# Patient Record
Sex: FEMALE | Race: OTHER | HISPANIC OR LATINO
[De-identification: names, ages, dates, MRNs, and addresses within clinical notes are randomized per-mention and may not be internally consistent; named-entity substitution may affect disease eponyms.]

---

## 2017-09-06 ENCOUNTER — APPOINTMENT (OUTPATIENT)
Dept: INTERNAL MEDICINE | Facility: CLINIC | Age: 47
End: 2017-09-06
Payer: COMMERCIAL

## 2017-09-06 PROCEDURE — 99212 OFFICE O/P EST SF 10 MIN: CPT

## 2017-10-04 ENCOUNTER — APPOINTMENT (OUTPATIENT)
Dept: MAMMOGRAPHY | Facility: CLINIC | Age: 47
End: 2017-10-04
Payer: COMMERCIAL

## 2017-10-04 ENCOUNTER — APPOINTMENT (OUTPATIENT)
Dept: ULTRASOUND IMAGING | Facility: CLINIC | Age: 47
End: 2017-10-04
Payer: COMMERCIAL

## 2017-10-04 ENCOUNTER — OUTPATIENT (OUTPATIENT)
Dept: OUTPATIENT SERVICES | Facility: HOSPITAL | Age: 47
LOS: 1 days | End: 2017-10-04
Payer: COMMERCIAL

## 2017-10-04 DIAGNOSIS — Z00.8 ENCOUNTER FOR OTHER GENERAL EXAMINATION: ICD-10-CM

## 2017-10-04 PROCEDURE — G0279: CPT

## 2017-10-04 PROCEDURE — G0204: CPT | Mod: 26

## 2017-10-04 PROCEDURE — G0279: CPT | Mod: 26

## 2017-10-04 PROCEDURE — 76641 ULTRASOUND BREAST COMPLETE: CPT | Mod: 26,50

## 2017-10-04 PROCEDURE — 77066 DX MAMMO INCL CAD BI: CPT

## 2017-10-04 PROCEDURE — 76641 ULTRASOUND BREAST COMPLETE: CPT

## 2017-11-10 ENCOUNTER — APPOINTMENT (OUTPATIENT)
Dept: INTERNAL MEDICINE | Facility: CLINIC | Age: 47
End: 2017-11-10
Payer: COMMERCIAL

## 2017-11-10 PROCEDURE — 99396 PREV VISIT EST AGE 40-64: CPT

## 2017-11-11 VITALS
WEIGHT: 150 LBS | BODY MASS INDEX: 24.21 KG/M2 | RESPIRATION RATE: 14 BRPM | HEART RATE: 88 BPM | SYSTOLIC BLOOD PRESSURE: 100 MMHG | DIASTOLIC BLOOD PRESSURE: 60 MMHG

## 2017-11-12 LAB
25(OH)D3 SERPL-MCNC: 39.2 NG/ML
ADJUSTED MITOGEN: >10 IU/ML
ADJUSTED TB AG: 0 IU/ML
ALBUMIN SERPL ELPH-MCNC: 4.3 G/DL
ALP BLD-CCNC: 53 U/L
ALT SERPL-CCNC: 17 U/L
ANA SER IF-ACNC: NEGATIVE
ANION GAP SERPL CALC-SCNC: 14 MMOL/L
AST SERPL-CCNC: 51 U/L
BASOPHILS # BLD AUTO: 0.02 K/UL
BASOPHILS NFR BLD AUTO: 0.2 %
BILIRUB SERPL-MCNC: 0.4 MG/DL
BUN SERPL-MCNC: 10 MG/DL
CALCIUM SERPL-MCNC: 10.1 MG/DL
CCP AB SER IA-ACNC: <8 UNITS
CHLORIDE SERPL-SCNC: 102 MMOL/L
CHOLEST SERPL-MCNC: 195 MG/DL
CHOLEST/HDLC SERPL: 2.7 RATIO
CO2 SERPL-SCNC: 25 MMOL/L
CREAT SERPL-MCNC: 0.79 MG/DL
CRP SERPL-MCNC: <0.2 MG/DL
EOSINOPHIL # BLD AUTO: 0.07 K/UL
EOSINOPHIL NFR BLD AUTO: 0.6 %
ERYTHROCYTE [SEDIMENTATION RATE] IN BLOOD BY WESTERGREN METHOD: 7 MM/HR
GLUCOSE SERPL-MCNC: 96 MG/DL
HBA1C MFR BLD HPLC: 5.2 %
HCT VFR BLD CALC: 40.2 %
HDLC SERPL-MCNC: 73 MG/DL
HGB BLD-MCNC: 13.2 G/DL
IMM GRANULOCYTES NFR BLD AUTO: 0.3 %
LDLC SERPL CALC-MCNC: 112 MG/DL
LYMPHOCYTES # BLD AUTO: 2.1 K/UL
LYMPHOCYTES NFR BLD AUTO: 18 %
M TB IFN-G BLD-IMP: NEGATIVE
MAN DIFF?: NORMAL
MCHC RBC-ENTMCNC: 29.3 PG
MCHC RBC-ENTMCNC: 32.8 GM/DL
MCV RBC AUTO: 89.3 FL
MONOCYTES # BLD AUTO: 0.7 K/UL
MONOCYTES NFR BLD AUTO: 6 %
NEUTROPHILS # BLD AUTO: 8.73 K/UL
NEUTROPHILS NFR BLD AUTO: 74.9 %
PLATELET # BLD AUTO: 244 K/UL
POTASSIUM SERPL-SCNC: 3.5 MMOL/L
PROT SERPL-MCNC: 7.6 G/DL
QUANTIFERON GOLD NIL: 0.02 IU/ML
RBC # BLD: 4.5 M/UL
RBC # FLD: 13.2 %
RF+CCP IGG SER-IMP: NEGATIVE
SODIUM SERPL-SCNC: 141 MMOL/L
TRIGL SERPL-MCNC: 48 MG/DL
TSH SERPL-ACNC: 1.07 UIU/ML
WBC # FLD AUTO: 11.65 K/UL

## 2018-10-22 ENCOUNTER — RESULT REVIEW (OUTPATIENT)
Age: 48
End: 2018-10-22

## 2018-10-30 ENCOUNTER — TRANSCRIPTION ENCOUNTER (OUTPATIENT)
Age: 48
End: 2018-10-30

## 2018-10-30 ENCOUNTER — OUTPATIENT (OUTPATIENT)
Dept: OUTPATIENT SERVICES | Facility: HOSPITAL | Age: 48
LOS: 1 days | End: 2018-10-30
Payer: COMMERCIAL

## 2018-10-30 VITALS
TEMPERATURE: 99 F | RESPIRATION RATE: 16 BRPM | OXYGEN SATURATION: 99 % | HEIGHT: 65 IN | DIASTOLIC BLOOD PRESSURE: 78 MMHG | HEART RATE: 88 BPM | WEIGHT: 149.91 LBS | SYSTOLIC BLOOD PRESSURE: 118 MMHG

## 2018-10-30 DIAGNOSIS — Z98.890 OTHER SPECIFIED POSTPROCEDURAL STATES: Chronic | ICD-10-CM

## 2018-10-30 DIAGNOSIS — N92.5 OTHER SPECIFIED IRREGULAR MENSTRUATION: ICD-10-CM

## 2018-10-30 PROCEDURE — G0463: CPT

## 2018-10-30 RX ORDER — LIDOCAINE HCL 20 MG/ML
0.2 VIAL (ML) INJECTION ONCE
Qty: 0 | Refills: 0 | Status: DISCONTINUED | OUTPATIENT
Start: 2018-10-31 | End: 2018-11-15

## 2018-10-30 RX ORDER — SODIUM CHLORIDE 9 MG/ML
3 INJECTION INTRAMUSCULAR; INTRAVENOUS; SUBCUTANEOUS EVERY 8 HOURS
Qty: 0 | Refills: 0 | Status: DISCONTINUED | OUTPATIENT
Start: 2018-10-31 | End: 2018-11-15

## 2018-10-30 NOTE — H&P PST ADULT - PROBLEM SELECTOR PLAN 1
Dilation and curettage    PSt instruction given    Has CBC done Oct 23, 2018 on alscript Dilation and curettage    PSt instruction given    Has CBC done Oct 23, 2018 on alscript   Patient is" Jehovah Witness " paperwork for refusal of blood transfusion will bring in AM

## 2018-10-30 NOTE — H&P PST ADULT - HISTORY OF PRESENT ILLNESS
46 y/o female came in for PSt today for dilation and curettage. Patient has hx of irregular menstrual bleeding , She has been bleeding since September 13,2018. She was being follow-up by Gyn  MD  started on medication, but continuously bleeds . Re-evaluated and now schedule this procedure for treatment. 48 y/o female came in for PSt today for dilation and curettage. Patient has hx of irregular menstrual bleeding , She has been bleeding since September 13,2018. She was being follow-up by Gyn  MD  started on medication, but continuously bleeds . Re-evaluated and now schedule this procedure for treatment.      *** Patient is Jehovah Witness  will bring her paper for blood transfusion refusal  in AM ******

## 2018-10-31 ENCOUNTER — OUTPATIENT (OUTPATIENT)
Dept: OUTPATIENT SERVICES | Facility: HOSPITAL | Age: 48
LOS: 1 days | End: 2018-10-31
Payer: COMMERCIAL

## 2018-10-31 ENCOUNTER — RESULT REVIEW (OUTPATIENT)
Age: 48
End: 2018-10-31

## 2018-10-31 VITALS
RESPIRATION RATE: 16 BRPM | OXYGEN SATURATION: 100 % | SYSTOLIC BLOOD PRESSURE: 99 MMHG | DIASTOLIC BLOOD PRESSURE: 56 MMHG | HEART RATE: 77 BPM

## 2018-10-31 VITALS
HEART RATE: 74 BPM | DIASTOLIC BLOOD PRESSURE: 67 MMHG | SYSTOLIC BLOOD PRESSURE: 103 MMHG | HEIGHT: 65 IN | WEIGHT: 149.91 LBS | OXYGEN SATURATION: 100 % | RESPIRATION RATE: 18 BRPM | TEMPERATURE: 99 F

## 2018-10-31 DIAGNOSIS — N92.5 OTHER SPECIFIED IRREGULAR MENSTRUATION: ICD-10-CM

## 2018-10-31 DIAGNOSIS — Z98.890 OTHER SPECIFIED POSTPROCEDURAL STATES: Chronic | ICD-10-CM

## 2018-10-31 PROCEDURE — 88305 TISSUE EXAM BY PATHOLOGIST: CPT | Mod: 26

## 2018-10-31 PROCEDURE — 58120 DILATION AND CURETTAGE: CPT

## 2018-10-31 PROCEDURE — 88305 TISSUE EXAM BY PATHOLOGIST: CPT

## 2018-10-31 RX ORDER — ONDANSETRON 8 MG/1
4 TABLET, FILM COATED ORAL ONCE
Qty: 0 | Refills: 0 | Status: DISCONTINUED | OUTPATIENT
Start: 2018-10-31 | End: 2018-11-15

## 2018-10-31 RX ORDER — OXYCODONE HYDROCHLORIDE 5 MG/1
10 TABLET ORAL ONCE
Qty: 0 | Refills: 0 | Status: DISCONTINUED | OUTPATIENT
Start: 2018-10-31 | End: 2018-10-31

## 2018-10-31 RX ORDER — OXYCODONE HYDROCHLORIDE 5 MG/1
5 TABLET ORAL ONCE
Qty: 0 | Refills: 0 | Status: DISCONTINUED | OUTPATIENT
Start: 2018-10-31 | End: 2018-10-31

## 2018-10-31 RX ORDER — MILK THISTLE 180 MG
0 CAPSULE ORAL
Qty: 0 | Refills: 0 | COMMUNITY

## 2018-10-31 RX ORDER — SODIUM CHLORIDE 9 MG/ML
1000 INJECTION, SOLUTION INTRAVENOUS
Qty: 0 | Refills: 0 | Status: DISCONTINUED | OUTPATIENT
Start: 2018-10-31 | End: 2018-11-15

## 2018-10-31 RX ORDER — BENZOYL PEROXIDE MICRONIZED 5.8 %
1 TOWELETTE (EA) TOPICAL
Qty: 0 | Refills: 0 | COMMUNITY

## 2018-10-31 RX ORDER — MEDROXYPROGESTERONE ACETATE 150 MG/ML
1 INJECTION, SUSPENSION, EXTENDED RELEASE INTRAMUSCULAR
Qty: 0 | Refills: 0 | COMMUNITY

## 2018-10-31 NOTE — BRIEF OPERATIVE NOTE - PROCEDURE
<<-----Click on this checkbox to enter Procedure Dilatation & curettage  10/31/2018    Active  SSEIDMA

## 2018-10-31 NOTE — ASU DISCHARGE PLAN (ADULT/PEDIATRIC). - ACTIVITY LEVEL
nothing per vagina/no tampons/no sports/gym/no heavy lifting/no tub baths/no douching/no intercourse

## 2018-11-01 PROBLEM — J45.909 UNSPECIFIED ASTHMA, UNCOMPLICATED: Chronic | Status: ACTIVE | Noted: 2018-10-30

## 2018-11-01 PROBLEM — G43.909 MIGRAINE, UNSPECIFIED, NOT INTRACTABLE, WITHOUT STATUS MIGRAINOSUS: Chronic | Status: ACTIVE | Noted: 2018-10-30

## 2018-11-01 LAB — SURGICAL PATHOLOGY STUDY: SIGNIFICANT CHANGE UP

## 2018-11-21 ENCOUNTER — APPOINTMENT (OUTPATIENT)
Dept: MAMMOGRAPHY | Facility: CLINIC | Age: 48
End: 2018-11-21
Payer: COMMERCIAL

## 2018-11-21 ENCOUNTER — APPOINTMENT (OUTPATIENT)
Dept: ULTRASOUND IMAGING | Facility: CLINIC | Age: 48
End: 2018-11-21
Payer: COMMERCIAL

## 2018-11-21 ENCOUNTER — OUTPATIENT (OUTPATIENT)
Dept: OUTPATIENT SERVICES | Facility: HOSPITAL | Age: 48
LOS: 1 days | End: 2018-11-21
Payer: COMMERCIAL

## 2018-11-21 DIAGNOSIS — Z98.890 OTHER SPECIFIED POSTPROCEDURAL STATES: Chronic | ICD-10-CM

## 2018-11-21 DIAGNOSIS — Z12.31 ENCOUNTER FOR SCREENING MAMMOGRAM FOR MALIGNANT NEOPLASM OF BREAST: ICD-10-CM

## 2018-11-21 DIAGNOSIS — R92.2 INCONCLUSIVE MAMMOGRAM: ICD-10-CM

## 2018-11-21 PROCEDURE — 77067 SCR MAMMO BI INCL CAD: CPT | Mod: 26

## 2018-11-21 PROCEDURE — 76641 ULTRASOUND BREAST COMPLETE: CPT

## 2018-11-21 PROCEDURE — 77063 BREAST TOMOSYNTHESIS BI: CPT | Mod: 26

## 2018-11-21 PROCEDURE — 76641 ULTRASOUND BREAST COMPLETE: CPT | Mod: 26,50

## 2018-11-21 PROCEDURE — 77063 BREAST TOMOSYNTHESIS BI: CPT

## 2018-11-21 PROCEDURE — 77067 SCR MAMMO BI INCL CAD: CPT

## 2018-12-06 ENCOUNTER — APPOINTMENT (OUTPATIENT)
Dept: MAMMOGRAPHY | Facility: CLINIC | Age: 48
End: 2018-12-06
Payer: COMMERCIAL

## 2018-12-06 ENCOUNTER — OUTPATIENT (OUTPATIENT)
Dept: OUTPATIENT SERVICES | Facility: HOSPITAL | Age: 48
LOS: 1 days | End: 2018-12-06
Payer: COMMERCIAL

## 2018-12-06 ENCOUNTER — APPOINTMENT (OUTPATIENT)
Dept: ULTRASOUND IMAGING | Facility: CLINIC | Age: 48
End: 2018-12-06
Payer: COMMERCIAL

## 2018-12-06 DIAGNOSIS — Z00.8 ENCOUNTER FOR OTHER GENERAL EXAMINATION: ICD-10-CM

## 2018-12-06 DIAGNOSIS — R92.2 INCONCLUSIVE MAMMOGRAM: ICD-10-CM

## 2018-12-06 DIAGNOSIS — Z98.890 OTHER SPECIFIED POSTPROCEDURAL STATES: Chronic | ICD-10-CM

## 2018-12-06 PROCEDURE — 76641 ULTRASOUND BREAST COMPLETE: CPT | Mod: 26,LT

## 2018-12-06 PROCEDURE — 77065 DX MAMMO INCL CAD UNI: CPT | Mod: 26,LT

## 2018-12-06 PROCEDURE — 77065 DX MAMMO INCL CAD UNI: CPT

## 2018-12-06 PROCEDURE — 76641 ULTRASOUND BREAST COMPLETE: CPT

## 2018-12-06 PROCEDURE — G0279: CPT | Mod: 26

## 2018-12-06 PROCEDURE — G0279: CPT

## 2019-11-08 ENCOUNTER — APPOINTMENT (OUTPATIENT)
Dept: INTERNAL MEDICINE | Facility: CLINIC | Age: 49
End: 2019-11-08
Payer: COMMERCIAL

## 2019-11-08 VITALS
BODY MASS INDEX: 23.57 KG/M2 | HEART RATE: 74 BPM | RESPIRATION RATE: 14 BRPM | WEIGHT: 146 LBS | DIASTOLIC BLOOD PRESSURE: 70 MMHG | SYSTOLIC BLOOD PRESSURE: 94 MMHG

## 2019-11-08 DIAGNOSIS — M25.50 PAIN IN UNSPECIFIED JOINT: ICD-10-CM

## 2019-11-08 PROCEDURE — G0442 ANNUAL ALCOHOL SCREEN 15 MIN: CPT

## 2019-11-08 PROCEDURE — G0444 DEPRESSION SCREEN ANNUAL: CPT

## 2019-11-08 PROCEDURE — 99386 PREV VISIT NEW AGE 40-64: CPT

## 2019-11-08 NOTE — HEALTH RISK ASSESSMENT
[Excellent] : ~his/her~  mood as  excellent [Yes] : Yes [Monthly or less (1 pt)] : Monthly or less (1 point) [3 or 4 (1 pt)] : 3 or 4  (1 point) [Never (0 pts)] : Never (0 points) [No] : In the past 12 months have you used drugs other than those required for medical reasons? No [No falls in past year] : Patient reported no falls in the past year [None] : None [With Family] : lives with family [Employed] : employed [] :  [Feels Safe at Home] : Feels safe at home [Fully functional (bathing, dressing, toileting, transferring, walking, feeding)] : Fully functional (bathing, dressing, toileting, transferring, walking, feeding) [Fully functional (using the telephone, shopping, preparing meals, housekeeping, doing laundry, using] : Fully functional and needs no help or supervision to perform IADLs (using the telephone, shopping, preparing meals, housekeeping, doing laundry, using transportation, managing medications and managing finances) [Reports normal functional visual acuity (ie: able to read med bottle)] : Reports normal functional visual acuity [Smoke Detector] : smoke detector [Seat Belt] :  uses seat belt [] : No [1] : 2) Feeling down, depressed, or hopeless for several days (1) [Audit-CScore] : 2 [[id number]] : 2 [Patient reported PAP Smear was normal] : Patient reported PAP Smear was normal [Change in mental status noted] : No change in mental status noted [Language] : denies difficulty with language [Behavior] : denies difficulty with behavior [Learning/Retaining New Information] : denies difficulty learning/retaining new information [Handling Complex Tasks] : denies difficulty handling complex tasks [Reasoning] : denies difficulty with reasoning [Spatial Ability and Orientation] : denies difficulty with spatial ability and orientation [Reports changes in hearing] : Reports no changes in hearing [Reports changes in vision] : Reports no changes in vision [Reports changes in dental health] : Reports no changes in dental health [PapSmearDate] : 9/1/2019

## 2019-11-08 NOTE — HISTORY OF PRESENT ILLNESS
[FreeTextEntry1] : Here for CPE.\par  [de-identified] : Needs forms completed for work.\par Still having menstrual dysphoria. Is interested in trying medicinal therapy. \par Would like to go to PT for back and neck issue. \par \par Denies headache, dizziness.\par Denies rash, fatigue, fever, weight loss, anorexia.\par Denies cough, wheezing.\par Denies CP, SOB, KING, edema, palpitations.\par Denies abdominal pain, N/V/D/C. Denies BRBPR, melena\par Denies dysuria, frequency, hematuria.\par

## 2019-11-09 ENCOUNTER — TRANSCRIPTION ENCOUNTER (OUTPATIENT)
Age: 49
End: 2019-11-09

## 2019-11-09 LAB
25(OH)D3 SERPL-MCNC: 53.8 NG/ML
ALBUMIN SERPL ELPH-MCNC: 4.3 G/DL
ALP BLD-CCNC: 61 U/L
ALT SERPL-CCNC: 14 U/L
ANION GAP SERPL CALC-SCNC: 14 MMOL/L
AST SERPL-CCNC: 15 U/L
BASOPHILS # BLD AUTO: 0.03 K/UL
BASOPHILS NFR BLD AUTO: 0.4 %
BILIRUB SERPL-MCNC: 0.4 MG/DL
BUN SERPL-MCNC: 9 MG/DL
CALCIUM SERPL-MCNC: 9.4 MG/DL
CHLORIDE SERPL-SCNC: 103 MMOL/L
CHOLEST SERPL-MCNC: 214 MG/DL
CHOLEST/HDLC SERPL: 2.7 RATIO
CO2 SERPL-SCNC: 23 MMOL/L
CREAT SERPL-MCNC: 0.75 MG/DL
EOSINOPHIL # BLD AUTO: 0.08 K/UL
EOSINOPHIL NFR BLD AUTO: 1.1 %
ESTIMATED AVERAGE GLUCOSE: 105 MG/DL
GLUCOSE SERPL-MCNC: 80 MG/DL
HBA1C MFR BLD HPLC: 5.3 %
HCT VFR BLD CALC: 36.6 %
HDLC SERPL-MCNC: 78 MG/DL
HGB BLD-MCNC: 11.7 G/DL
IMM GRANULOCYTES NFR BLD AUTO: 0.4 %
LDLC SERPL CALC-MCNC: 125 MG/DL
LDLC SERPL DIRECT ASSAY-MCNC: 137 MG/DL
LYMPHOCYTES # BLD AUTO: 2.11 K/UL
LYMPHOCYTES NFR BLD AUTO: 28 %
MAN DIFF?: NORMAL
MCHC RBC-ENTMCNC: 27.9 PG
MCHC RBC-ENTMCNC: 32 GM/DL
MCV RBC AUTO: 87.4 FL
MEV IGG FLD QL IA: >300 AU/ML
MEV IGG+IGM SER-IMP: POSITIVE
MONOCYTES # BLD AUTO: 0.43 K/UL
MONOCYTES NFR BLD AUTO: 5.7 %
NEUTROPHILS # BLD AUTO: 4.86 K/UL
NEUTROPHILS NFR BLD AUTO: 64.4 %
PLATELET # BLD AUTO: 252 K/UL
POTASSIUM SERPL-SCNC: 4.2 MMOL/L
PROT SERPL-MCNC: 7 G/DL
RBC # BLD: 4.19 M/UL
RBC # FLD: 13.8 %
RUBV IGG FLD-ACNC: 20 INDEX
RUBV IGG SER-IMP: POSITIVE
SODIUM SERPL-SCNC: 140 MMOL/L
TRIGL SERPL-MCNC: 54 MG/DL
VIT B12 SERPL-MCNC: 803 PG/ML
WBC # FLD AUTO: 7.54 K/UL

## 2019-11-10 LAB
M TB IFN-G BLD-IMP: NEGATIVE
QUANTIFERON TB PLUS MITOGEN MINUS NIL: 4.24 IU/ML
QUANTIFERON TB PLUS NIL: 0.02 IU/ML
QUANTIFERON TB PLUS TB1 MINUS NIL: 0 IU/ML
QUANTIFERON TB PLUS TB2 MINUS NIL: 0 IU/ML

## 2019-11-12 ENCOUNTER — OTHER (OUTPATIENT)
Age: 49
End: 2019-11-12

## 2019-11-12 LAB — VIT B6 SERPL-MCNC: 4.9 UG/L

## 2019-11-13 ENCOUNTER — OTHER (OUTPATIENT)
Age: 49
End: 2019-11-13

## 2019-11-13 LAB — VIT B1 SERPL-MCNC: 119.9 NMOL/L

## 2019-11-22 ENCOUNTER — RESULT REVIEW (OUTPATIENT)
Age: 49
End: 2019-11-22

## 2020-12-04 ENCOUNTER — APPOINTMENT (OUTPATIENT)
Dept: INTERNAL MEDICINE | Facility: CLINIC | Age: 50
End: 2020-12-04

## 2021-03-30 ENCOUNTER — APPOINTMENT (OUTPATIENT)
Dept: ULTRASOUND IMAGING | Facility: CLINIC | Age: 51
End: 2021-03-30
Payer: COMMERCIAL

## 2021-03-30 ENCOUNTER — APPOINTMENT (OUTPATIENT)
Dept: MAMMOGRAPHY | Facility: CLINIC | Age: 51
End: 2021-03-30
Payer: COMMERCIAL

## 2021-03-30 ENCOUNTER — OUTPATIENT (OUTPATIENT)
Dept: OUTPATIENT SERVICES | Facility: HOSPITAL | Age: 51
LOS: 1 days | End: 2021-03-30
Payer: COMMERCIAL

## 2021-03-30 DIAGNOSIS — Z00.8 ENCOUNTER FOR OTHER GENERAL EXAMINATION: ICD-10-CM

## 2021-03-30 DIAGNOSIS — Z98.890 OTHER SPECIFIED POSTPROCEDURAL STATES: Chronic | ICD-10-CM

## 2021-03-30 PROCEDURE — 77063 BREAST TOMOSYNTHESIS BI: CPT | Mod: 26

## 2021-03-30 PROCEDURE — 76641 ULTRASOUND BREAST COMPLETE: CPT | Mod: 26,50

## 2021-03-30 PROCEDURE — 77067 SCR MAMMO BI INCL CAD: CPT | Mod: 26

## 2021-03-30 PROCEDURE — 77063 BREAST TOMOSYNTHESIS BI: CPT

## 2021-03-30 PROCEDURE — 77067 SCR MAMMO BI INCL CAD: CPT

## 2021-03-30 PROCEDURE — 76641 ULTRASOUND BREAST COMPLETE: CPT

## 2021-04-01 ENCOUNTER — TRANSCRIPTION ENCOUNTER (OUTPATIENT)
Age: 51
End: 2021-04-01

## 2021-07-21 ENCOUNTER — NON-APPOINTMENT (OUTPATIENT)
Age: 51
End: 2021-07-21

## 2021-07-21 ENCOUNTER — APPOINTMENT (OUTPATIENT)
Dept: GASTROENTEROLOGY | Facility: CLINIC | Age: 51
End: 2021-07-21
Payer: COMMERCIAL

## 2021-07-21 VITALS
SYSTOLIC BLOOD PRESSURE: 120 MMHG | HEART RATE: 79 BPM | WEIGHT: 154 LBS | HEIGHT: 66 IN | OXYGEN SATURATION: 98 % | BODY MASS INDEX: 24.75 KG/M2 | TEMPERATURE: 97.5 F | DIASTOLIC BLOOD PRESSURE: 70 MMHG

## 2021-07-21 DIAGNOSIS — R14.0 ABDOMINAL DISTENSION (GASEOUS): ICD-10-CM

## 2021-07-21 PROCEDURE — 99243 OFF/OP CNSLTJ NEW/EST LOW 30: CPT

## 2021-07-21 PROCEDURE — 99072 ADDL SUPL MATRL&STAF TM PHE: CPT

## 2021-07-21 RX ORDER — SODIUM PICOSULFATE, MAGNESIUM OXIDE, AND ANHYDROUS CITRIC ACID 10; 3.5; 12 MG/160ML; G/160ML; G/160ML
10-3.5-12 MG-GM LIQUID ORAL
Qty: 1 | Refills: 0 | Status: ACTIVE | COMMUNITY
Start: 2021-07-21 | End: 1900-01-01

## 2021-07-21 NOTE — HISTORY OF PRESENT ILLNESS
[FreeTextEntry1] : This is a pleasant 50-year-old female with history of seasonal asthma referred by Dr. George Mares for consultation for colon cancer screening.  She has never had a lower GI evaluation.  She reports abdominal gas and bloating.  She thinks she may be lactose intolerant.  She denies changes in bowel habits.  She denies rectal bleeding or melena.  She denies weight loss or anemia.  She denies family history of colon cancer.  She states that both her mother and her sister have colon polyps but she does not know the type.

## 2021-07-21 NOTE — ASSESSMENT
[FreeTextEntry1] : This is a 50-year-old female presenting for colon cancer screening evaluation.  I explained to her the risks, alternatives and benefits to a colonoscopy.  Risk including but not limited to bleeding, perforation, infection and adverse medication reaction.  Questions were answered.  She stated understanding.  She reports family history of colon polyps in her mother and her sister but she does not know the type of polyps.  I recommend she inquires with her family members as to the type of polyps because if they were high risk adenomatous colon polyp, she will need screening colonoscopies every 5 years instead of every 10 years.  For the gas and bloating, this most likely due to lactose intolerance.  I recommend a 2-week trial on a lactose-free diet.

## 2021-08-13 ENCOUNTER — APPOINTMENT (OUTPATIENT)
Dept: INTERNAL MEDICINE | Facility: CLINIC | Age: 51
End: 2021-08-13
Payer: COMMERCIAL

## 2021-08-13 DIAGNOSIS — J45.909 UNSPECIFIED ASTHMA, UNCOMPLICATED: ICD-10-CM

## 2021-08-13 DIAGNOSIS — Z11.59 ENCOUNTER FOR SCREENING FOR OTHER VIRAL DISEASES: ICD-10-CM

## 2021-08-13 DIAGNOSIS — E55.9 VITAMIN D DEFICIENCY, UNSPECIFIED: ICD-10-CM

## 2021-08-13 PROCEDURE — G0444 DEPRESSION SCREEN ANNUAL: CPT | Mod: 59

## 2021-08-13 PROCEDURE — 99396 PREV VISIT EST AGE 40-64: CPT

## 2021-08-13 PROCEDURE — G0442 ANNUAL ALCOHOL SCREEN 15 MIN: CPT

## 2021-08-15 VITALS
BODY MASS INDEX: 24.21 KG/M2 | DIASTOLIC BLOOD PRESSURE: 60 MMHG | WEIGHT: 150 LBS | SYSTOLIC BLOOD PRESSURE: 94 MMHG | RESPIRATION RATE: 4 BRPM | HEART RATE: 74 BPM

## 2021-08-15 NOTE — HISTORY OF PRESENT ILLNESS
[FreeTextEntry1] : Ms. ACOSTA is here for an annual physical examination and assessment.\par  [de-identified] : She generally feels well with no specific complaints. Her recent health has been good.\par \par Denies headache, dizziness.\par Denies rash, fatigue, fever, weight loss, anorexia.\par Denies cough, wheezing.\par Denies CP, SOB, KING, edema, palpitations.\par Denies abdominal pain, N/V/D/C. Denies BRBPR, melena, dysphagia.\par Denies dysuria, frequency, hematuria, hesitancy.\par Denies weakness, numbness, gait instability.\par

## 2021-08-15 NOTE — HEALTH RISK ASSESSMENT
[] : No [Audit-CScore] : 1 [[id number]] : 0 [Patient reported mammogram was normal] : Patient reported mammogram was normal [Change in mental status noted] : No change in mental status noted [Language] : denies difficulty with language [Behavior] : denies difficulty with behavior [Learning/Retaining New Information] : denies difficulty learning/retaining new information [Handling Complex Tasks] : denies difficulty handling complex tasks [Spatial Ability and Orientation] : denies difficulty with spatial ability and orientation [Reasoning] : denies difficulty with reasoning [Reports changes in hearing] : Reports no changes in hearing [Reports changes in vision] : Reports no changes in vision [Reports normal functional visual acuity (ie: able to read med bottle)] : Reports poor functional visual acuity.  [Reports changes in dental health] : Reports no changes in dental health [MammogramDate] : 11/1/2020

## 2021-08-17 LAB
25(OH)D3 SERPL-MCNC: 41.5 NG/ML
ALBUMIN SERPL ELPH-MCNC: 4.8 G/DL
ALP BLD-CCNC: 63 U/L
ALT SERPL-CCNC: 19 U/L
ANION GAP SERPL CALC-SCNC: 13 MMOL/L
AST SERPL-CCNC: 19 U/L
BASOPHILS # BLD AUTO: 0.04 K/UL
BASOPHILS NFR BLD AUTO: 0.6 %
BILIRUB SERPL-MCNC: 0.4 MG/DL
BUN SERPL-MCNC: 11 MG/DL
CALCIUM SERPL-MCNC: 9.7 MG/DL
CHLORIDE SERPL-SCNC: 105 MMOL/L
CHOLEST SERPL-MCNC: 235 MG/DL
CO2 SERPL-SCNC: 22 MMOL/L
CREAT SERPL-MCNC: 0.74 MG/DL
EOSINOPHIL # BLD AUTO: 0.16 K/UL
EOSINOPHIL NFR BLD AUTO: 2.2 %
ESTIMATED AVERAGE GLUCOSE: 117 MG/DL
GLUCOSE SERPL-MCNC: 115 MG/DL
HBA1C MFR BLD HPLC: 5.7 %
HCT VFR BLD CALC: 42.7 %
HDLC SERPL-MCNC: 72 MG/DL
HGB BLD-MCNC: 13.7 G/DL
IMM GRANULOCYTES NFR BLD AUTO: 0.1 %
LDLC SERPL CALC-MCNC: 118 MG/DL
LDLC SERPL DIRECT ASSAY-MCNC: 133 MG/DL
LYMPHOCYTES # BLD AUTO: 2.31 K/UL
LYMPHOCYTES NFR BLD AUTO: 32.2 %
M TB IFN-G BLD-IMP: NEGATIVE
MAN DIFF?: NORMAL
MCHC RBC-ENTMCNC: 29 PG
MCHC RBC-ENTMCNC: 32.1 GM/DL
MCV RBC AUTO: 90.3 FL
MONOCYTES # BLD AUTO: 0.39 K/UL
MONOCYTES NFR BLD AUTO: 5.4 %
NEUTROPHILS # BLD AUTO: 4.26 K/UL
NEUTROPHILS NFR BLD AUTO: 59.5 %
NONHDLC SERPL-MCNC: 163 MG/DL
PLATELET # BLD AUTO: 249 K/UL
POTASSIUM SERPL-SCNC: 3.9 MMOL/L
PROT SERPL-MCNC: 7 G/DL
QUANTIFERON TB PLUS MITOGEN MINUS NIL: 5.53 IU/ML
QUANTIFERON TB PLUS NIL: 0.02 IU/ML
QUANTIFERON TB PLUS TB1 MINUS NIL: 0 IU/ML
QUANTIFERON TB PLUS TB2 MINUS NIL: 0 IU/ML
RBC # BLD: 4.73 M/UL
RBC # FLD: 12.5 %
SODIUM SERPL-SCNC: 140 MMOL/L
TRIGL SERPL-MCNC: 230 MG/DL
WBC # FLD AUTO: 7.17 K/UL

## 2021-10-01 ENCOUNTER — APPOINTMENT (OUTPATIENT)
Dept: DISASTER EMERGENCY | Facility: CLINIC | Age: 51
End: 2021-10-01

## 2021-10-02 LAB — SARS-COV-2 N GENE NPH QL NAA+PROBE: NOT DETECTED

## 2021-10-05 ENCOUNTER — APPOINTMENT (OUTPATIENT)
Dept: GASTROENTEROLOGY | Facility: AMBULATORY MEDICAL SERVICES | Age: 51
End: 2021-10-05

## 2021-10-07 ENCOUNTER — APPOINTMENT (OUTPATIENT)
Dept: DISASTER EMERGENCY | Facility: CLINIC | Age: 51
End: 2021-10-07

## 2021-12-07 ENCOUNTER — APPOINTMENT (OUTPATIENT)
Dept: GASTROENTEROLOGY | Facility: AMBULATORY MEDICAL SERVICES | Age: 51
End: 2021-12-07
Payer: COMMERCIAL

## 2021-12-07 PROCEDURE — 45380 COLONOSCOPY AND BIOPSY: CPT | Mod: 33,59

## 2021-12-07 PROCEDURE — 45385 COLONOSCOPY W/LESION REMOVAL: CPT | Mod: 33

## 2021-12-16 ENCOUNTER — NON-APPOINTMENT (OUTPATIENT)
Age: 51
End: 2021-12-16

## 2022-01-11 ENCOUNTER — TRANSCRIPTION ENCOUNTER (OUTPATIENT)
Age: 52
End: 2022-01-11

## 2022-04-11 PROBLEM — Z11.59 SCREENING FOR VIRAL DISEASE: Status: RESOLVED | Noted: 2021-08-13 | Resolved: 2021-08-15

## 2022-04-18 ENCOUNTER — APPOINTMENT (OUTPATIENT)
Dept: ULTRASOUND IMAGING | Facility: IMAGING CENTER | Age: 52
End: 2022-04-18
Payer: COMMERCIAL

## 2022-04-18 ENCOUNTER — OUTPATIENT (OUTPATIENT)
Dept: OUTPATIENT SERVICES | Facility: HOSPITAL | Age: 52
LOS: 1 days | End: 2022-04-18
Payer: COMMERCIAL

## 2022-04-18 ENCOUNTER — APPOINTMENT (OUTPATIENT)
Dept: MAMMOGRAPHY | Facility: IMAGING CENTER | Age: 52
End: 2022-04-18
Payer: COMMERCIAL

## 2022-04-18 DIAGNOSIS — Z98.890 OTHER SPECIFIED POSTPROCEDURAL STATES: Chronic | ICD-10-CM

## 2022-04-18 DIAGNOSIS — Z12.31 ENCOUNTER FOR SCREENING MAMMOGRAM FOR MALIGNANT NEOPLASM OF BREAST: ICD-10-CM

## 2022-04-18 DIAGNOSIS — R92.2 INCONCLUSIVE MAMMOGRAM: ICD-10-CM

## 2022-04-18 PROCEDURE — 77067 SCR MAMMO BI INCL CAD: CPT | Mod: 26

## 2022-04-18 PROCEDURE — 76641 ULTRASOUND BREAST COMPLETE: CPT

## 2022-04-18 PROCEDURE — 77067 SCR MAMMO BI INCL CAD: CPT

## 2022-04-18 PROCEDURE — 77063 BREAST TOMOSYNTHESIS BI: CPT | Mod: 26

## 2022-04-18 PROCEDURE — 77063 BREAST TOMOSYNTHESIS BI: CPT

## 2022-04-18 PROCEDURE — 76641 ULTRASOUND BREAST COMPLETE: CPT | Mod: 26,50

## 2022-08-05 ENCOUNTER — APPOINTMENT (OUTPATIENT)
Dept: INTERNAL MEDICINE | Facility: CLINIC | Age: 52
End: 2022-08-05

## 2022-08-05 VITALS
HEART RATE: 70 BPM | WEIGHT: 159 LBS | RESPIRATION RATE: 14 BRPM | DIASTOLIC BLOOD PRESSURE: 60 MMHG | SYSTOLIC BLOOD PRESSURE: 108 MMHG | BODY MASS INDEX: 25.66 KG/M2

## 2022-08-05 PROCEDURE — 99396 PREV VISIT EST AGE 40-64: CPT

## 2022-08-05 PROCEDURE — G0444 DEPRESSION SCREEN ANNUAL: CPT | Mod: 59

## 2022-08-05 RX ORDER — BENZONATATE 100 MG/1
100 CAPSULE ORAL 3 TIMES DAILY
Qty: 30 | Refills: 5 | Status: ACTIVE | COMMUNITY
Start: 2022-08-05 | End: 1900-01-01

## 2022-08-05 NOTE — HEALTH RISK ASSESSMENT
[Excellent] : ~his/her~  mood as  excellent [Never] : Never [Yes] : Yes [Monthly or less (1 pt)] : Monthly or less (1 point) [3 or 4 (1 pt)] : 3 or 4  (1 point) [Never (0 pts)] : Never (0 points) [No] : In the past 12 months have you used drugs other than those required for medical reasons? No [No falls in past year] : Patient reported no falls in the past year [0] : 2) Feeling down, depressed, or hopeless: Not at all (0) [PHQ-2 Negative - No further assessment needed] : PHQ-2 Negative - No further assessment needed [None] : None [With Family] : lives with family [Employed] : employed [] :  [Feels Safe at Home] : Feels safe at home [Fully functional (bathing, dressing, toileting, transferring, walking, feeding)] : Fully functional (bathing, dressing, toileting, transferring, walking, feeding) [Fully functional (using the telephone, shopping, preparing meals, housekeeping, doing laundry, using] : Fully functional and needs no help or supervision to perform IADLs (using the telephone, shopping, preparing meals, housekeeping, doing laundry, using transportation, managing medications and managing finances) [Reports normal functional visual acuity (ie: able to read med bottle)] : Reports normal functional visual acuity [Smoke Detector] : smoke detector [Seat Belt] :  uses seat belt [Audit-CScore] : 2 [[id number]] : 0 [Change in mental status noted] : No change in mental status noted [Language] : denies difficulty with language [Behavior] : denies difficulty with behavior [Learning/Retaining New Information] : denies difficulty learning/retaining new information [Handling Complex Tasks] : denies difficulty handling complex tasks [Reasoning] : denies difficulty with reasoning [Spatial Ability and Orientation] : denies difficulty with spatial ability and orientation [Reports changes in hearing] : Reports no changes in hearing [Reports changes in vision] : Reports no changes in vision [Reports changes in dental health] : Reports no changes in dental health

## 2022-08-05 NOTE — HISTORY OF PRESENT ILLNESS
[FreeTextEntry1] : Ms. ACOSTA is here for an annual physical examination and assessment.\par  [de-identified] : She generally feels well with no specific complaints. 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 Denies BRBPR, melena, dysphagia.\par Denies dysuria, frequency, hematuria, hesitancy.\par Denies weakness, numbness, gait instability.\par

## 2022-08-06 LAB
25(OH)D3 SERPL-MCNC: 32.7 NG/ML
ALBUMIN SERPL ELPH-MCNC: 4.5 G/DL
ALP BLD-CCNC: 79 U/L
ALT SERPL-CCNC: 18 U/L
ANION GAP SERPL CALC-SCNC: 11 MMOL/L
AST SERPL-CCNC: 16 U/L
BASOPHILS # BLD AUTO: 0.04 K/UL
BASOPHILS NFR BLD AUTO: 0.5 %
BILIRUB SERPL-MCNC: 0.2 MG/DL
BUN SERPL-MCNC: 11 MG/DL
CALCIUM SERPL-MCNC: 9.9 MG/DL
CHLORIDE SERPL-SCNC: 104 MMOL/L
CHOLEST SERPL-MCNC: 217 MG/DL
CO2 SERPL-SCNC: 27 MMOL/L
CREAT SERPL-MCNC: 0.86 MG/DL
EGFR: 82 ML/MIN/1.73M2
EOSINOPHIL # BLD AUTO: 0.17 K/UL
EOSINOPHIL NFR BLD AUTO: 2.2 %
ESTIMATED AVERAGE GLUCOSE: 108 MG/DL
GLUCOSE SERPL-MCNC: 96 MG/DL
HBA1C MFR BLD HPLC: 5.4 %
HCT VFR BLD CALC: 40.5 %
HDLC SERPL-MCNC: 57 MG/DL
HGB BLD-MCNC: 13 G/DL
IMM GRANULOCYTES NFR BLD AUTO: 0.1 %
LDLC SERPL CALC-MCNC: 130 MG/DL
LDLC SERPL DIRECT ASSAY-MCNC: 137 MG/DL
LYMPHOCYTES # BLD AUTO: 2.42 K/UL
LYMPHOCYTES NFR BLD AUTO: 31.4 %
MAN DIFF?: NORMAL
MCHC RBC-ENTMCNC: 29.1 PG
MCHC RBC-ENTMCNC: 32.1 GM/DL
MCV RBC AUTO: 90.6 FL
MONOCYTES # BLD AUTO: 0.46 K/UL
MONOCYTES NFR BLD AUTO: 6 %
NEUTROPHILS # BLD AUTO: 4.61 K/UL
NEUTROPHILS NFR BLD AUTO: 59.8 %
NONHDLC SERPL-MCNC: 160 MG/DL
PLATELET # BLD AUTO: 244 K/UL
POTASSIUM SERPL-SCNC: 4.1 MMOL/L
PROT SERPL-MCNC: 6.8 G/DL
RBC # BLD: 4.47 M/UL
RBC # FLD: 13.3 %
SODIUM SERPL-SCNC: 143 MMOL/L
TRIGL SERPL-MCNC: 153 MG/DL
TSH SERPL-ACNC: 1.98 UIU/ML
WBC # FLD AUTO: 7.71 K/UL

## 2022-08-10 LAB
M TB IFN-G BLD-IMP: NEGATIVE
QUANTIFERON TB PLUS MITOGEN MINUS NIL: 8.82 IU/ML
QUANTIFERON TB PLUS NIL: 0.01 IU/ML
QUANTIFERON TB PLUS TB1 MINUS NIL: 0 IU/ML
QUANTIFERON TB PLUS TB2 MINUS NIL: 0 IU/ML

## 2022-08-31 NOTE — H&P PST ADULT - ENERGY EXPENDITURE (METS)
HPI:    Patient ID: Doc Sweet is a 13year old female. 25year-old female presents about 1 month post ingrown toenail procedure right and left. She is accompanied by her grandfather. No specific complaints or noted concerns are apparent. ROS:              No current outpatient medications on file. Allergies:No Known Allergies   PHYSICAL EXAM:   On physical exam healing is uneventful. There is nothing open or draining. I discussed the appropriate way to trim these toenails and I discussed the potential for recurrence. She is discharged today           ASSESSMENT/PLAN:   Ingrown toenail of both feet  (primary encounter diagnosis)    No orders of the defined types were placed in this encounter.       Meds This Visit:  Requested Prescriptions      No prescriptions requested or ordered in this encounter       Imaging & Referrals:  None       DI#[id number]
DASI score 9.89

## 2023-06-08 ENCOUNTER — NON-APPOINTMENT (OUTPATIENT)
Age: 53
End: 2023-06-08

## 2023-06-09 ENCOUNTER — NON-APPOINTMENT (OUTPATIENT)
Age: 53
End: 2023-06-09

## 2023-06-09 ENCOUNTER — APPOINTMENT (OUTPATIENT)
Dept: ORTHOPEDIC SURGERY | Facility: CLINIC | Age: 53
End: 2023-06-09
Payer: COMMERCIAL

## 2023-06-09 VITALS — WEIGHT: 159 LBS | BODY MASS INDEX: 26.49 KG/M2 | HEIGHT: 65 IN

## 2023-06-09 DIAGNOSIS — S99.921A UNSPECIFIED INJURY OF RIGHT FOOT, INITIAL ENCOUNTER: ICD-10-CM

## 2023-06-09 DIAGNOSIS — M72.2 PLANTAR FASCIAL FIBROMATOSIS: ICD-10-CM

## 2023-06-09 DIAGNOSIS — M21.861 OTHER SPECIFIED ACQUIRED DEFORMITIES OF RIGHT LOWER LEG: ICD-10-CM

## 2023-06-09 PROCEDURE — 73630 X-RAY EXAM OF FOOT: CPT | Mod: RT

## 2023-06-09 PROCEDURE — 99203 OFFICE O/P NEW LOW 30 MIN: CPT

## 2023-06-09 NOTE — DISCUSSION/SUMMARY
[de-identified] : Discussed with patient nature of condition, potential course / sequelae, options reviewed.  NB shoe wear, gel heel cup, activity modification, calf stretching exercises and HEP.  Educational handouts provided.  Return to office in 6 - 8 weeks / PRN, all questions answered.

## 2023-06-09 NOTE — HISTORY OF PRESENT ILLNESS
[Sneakers] : dionna [FreeTextEntry1] : 52 year old female presents to the office for initial evaluation of right medial heel pain x 1 month. She notes that after a day on her feet she is unable to bear weight on her right heel. She works at a [employer]. Denies prior injury/trauma to her right foot. She intermittently takes Advil for the pain with relief. Denies paresthesias.

## 2023-06-09 NOTE — PHYSICAL EXAM
[de-identified] : Extremity: +Equinus (releases) R LE, +heel varus R hindfoot (subtle), mild tenderness plantar fascia origin R hindfoot.  Nontender R ankle, peroneals, syndesmosis, Achilles, hindfoot ST, midfoot LF and PTT insertional, and forefoot.  Stable Drawer testing R ankle, 5 / 5 evertor strength R ankle, calves soft and nontender, sensorimotor unchanged, skin intact B LE.  AOx3, mood / affect normal. [de-identified] : Radiographs (3v R foot) reveal Ryan's R foot, increased calcaneal inclination R hindfoot.

## 2023-07-17 RX ORDER — ALPRAZOLAM 0.5 MG/1
0.5 TABLET ORAL 3 TIMES DAILY
Qty: 15 | Refills: 0 | Status: ACTIVE | COMMUNITY
Start: 2023-07-17 | End: 1900-01-01

## 2023-07-27 ENCOUNTER — NON-APPOINTMENT (OUTPATIENT)
Age: 53
End: 2023-07-27

## 2023-07-27 ENCOUNTER — APPOINTMENT (OUTPATIENT)
Dept: ORTHOPEDIC SURGERY | Facility: CLINIC | Age: 53
End: 2023-07-27

## 2023-08-20 PROBLEM — Z12.11 COLON CANCER SCREENING: Status: RESOLVED | Noted: 2021-07-21 | Resolved: 2023-08-20

## 2023-08-20 PROBLEM — Z13.21 ENCOUNTER FOR SCREENING FOR NUTRITIONAL DISORDER: Status: RESOLVED | Noted: 2019-11-08 | Resolved: 2023-08-20

## 2023-08-20 PROBLEM — Z01.818 PREOP TESTING: Status: RESOLVED | Noted: 2021-09-29 | Resolved: 2023-08-20

## 2023-08-20 PROBLEM — Z11.1 SCREENING FOR TUBERCULOSIS: Status: RESOLVED | Noted: 2021-08-13 | Resolved: 2023-08-20

## 2023-08-20 PROBLEM — R14.0 ABDOMINAL BLOATING: Status: RESOLVED | Noted: 2021-07-21 | Resolved: 2023-08-20

## 2023-08-20 NOTE — PHYSICAL EXAM
[No Acute Distress] : no acute distress [Well Nourished] : well nourished [Well Developed] : well developed [Normal Sclera/Conjunctiva] : normal sclera/conjunctiva [Well-Appearing] : well-appearing [PERRL] : pupils equal round and reactive to light [EOMI] : extraocular movements intact [Normal Outer Ear/Nose] : the outer ears and nose were normal in appearance [No JVD] : no jugular venous distention [Normal Oropharynx] : the oropharynx was normal [No Lymphadenopathy] : no lymphadenopathy [Supple] : supple [Thyroid Normal, No Nodules] : the thyroid was normal and there were no nodules present [No Respiratory Distress] : no respiratory distress  [No Accessory Muscle Use] : no accessory muscle use [Clear to Auscultation] : lungs were clear to auscultation bilaterally [Normal Rate] : normal rate  [Regular Rhythm] : with a regular rhythm [Normal S1, S2] : normal S1 and S2 [No Murmur] : no murmur heard [No Abdominal Bruit] : a ~M bruit was not heard ~T in the abdomen [No Carotid Bruits] : no carotid bruits [No Varicosities] : no varicosities [Pedal Pulses Present] : the pedal pulses are present [No Edema] : there was no peripheral edema [No Palpable Aorta] : no palpable aorta [Soft] : abdomen soft [No Extremity Clubbing/Cyanosis] : no extremity clubbing/cyanosis [Non Tender] : non-tender [Non-distended] : non-distended [No Masses] : no abdominal mass palpated [No HSM] : no HSM [Normal Posterior Cervical Nodes] : no posterior cervical lymphadenopathy [Normal Bowel Sounds] : normal bowel sounds [Normal Anterior Cervical Nodes] : no anterior cervical lymphadenopathy [No CVA Tenderness] : no CVA  tenderness [No Spinal Tenderness] : no spinal tenderness [Grossly Normal Strength/Tone] : grossly normal strength/tone [No Joint Swelling] : no joint swelling [No Rash] : no rash [Coordination Grossly Intact] : coordination grossly intact [No Focal Deficits] : no focal deficits [Normal Gait] : normal gait [Normal Affect] : the affect was normal [Deep Tendon Reflexes (DTR)] : deep tendon reflexes were 2+ and symmetric [Normal Insight/Judgement] : insight and judgment were intact

## 2023-08-21 ENCOUNTER — APPOINTMENT (OUTPATIENT)
Dept: INTERNAL MEDICINE | Facility: CLINIC | Age: 53
End: 2023-08-21
Payer: COMMERCIAL

## 2023-08-21 ENCOUNTER — APPOINTMENT (OUTPATIENT)
Dept: ULTRASOUND IMAGING | Facility: CLINIC | Age: 53
End: 2023-08-21
Payer: COMMERCIAL

## 2023-08-21 ENCOUNTER — OUTPATIENT (OUTPATIENT)
Dept: OUTPATIENT SERVICES | Facility: HOSPITAL | Age: 53
LOS: 1 days | End: 2023-08-21
Payer: COMMERCIAL

## 2023-08-21 ENCOUNTER — APPOINTMENT (OUTPATIENT)
Dept: MAMMOGRAPHY | Facility: CLINIC | Age: 53
End: 2023-08-21
Payer: COMMERCIAL

## 2023-08-21 VITALS
SYSTOLIC BLOOD PRESSURE: 108 MMHG | HEART RATE: 74 BPM | BODY MASS INDEX: 27.29 KG/M2 | WEIGHT: 164 LBS | DIASTOLIC BLOOD PRESSURE: 60 MMHG | RESPIRATION RATE: 14 BRPM

## 2023-08-21 DIAGNOSIS — Z13.21 ENCOUNTER FOR SCREENING FOR NUTRITIONAL DISORDER: ICD-10-CM

## 2023-08-21 DIAGNOSIS — R45.89 OTHER SYMPTOMS AND SIGNS INVOLVING EMOTIONAL STATE: ICD-10-CM

## 2023-08-21 DIAGNOSIS — F43.21 ADJUSTMENT DISORDER WITH DEPRESSED MOOD: ICD-10-CM

## 2023-08-21 DIAGNOSIS — Z00.8 ENCOUNTER FOR OTHER GENERAL EXAMINATION: ICD-10-CM

## 2023-08-21 DIAGNOSIS — Z01.818 ENCOUNTER FOR OTHER PREPROCEDURAL EXAMINATION: ICD-10-CM

## 2023-08-21 DIAGNOSIS — I10 ESSENTIAL (PRIMARY) HYPERTENSION: ICD-10-CM

## 2023-08-21 DIAGNOSIS — R14.0 ABDOMINAL DISTENSION (GASEOUS): ICD-10-CM

## 2023-08-21 DIAGNOSIS — Z12.11 ENCOUNTER FOR SCREENING FOR MALIGNANT NEOPLASM OF COLON: ICD-10-CM

## 2023-08-21 DIAGNOSIS — Z11.1 ENCOUNTER FOR SCREENING FOR RESPIRATORY TUBERCULOSIS: ICD-10-CM

## 2023-08-21 DIAGNOSIS — Z00.00 ENCOUNTER FOR GENERAL ADULT MEDICAL EXAMINATION W/OUT ABNORMAL FINDINGS: ICD-10-CM

## 2023-08-21 LAB
25(OH)D3 SERPL-MCNC: 26.4 NG/ML
ALBUMIN SERPL ELPH-MCNC: 4.4 G/DL
ALP BLD-CCNC: 80 U/L
ALT SERPL-CCNC: 14 U/L
ANION GAP SERPL CALC-SCNC: 9 MMOL/L
AST SERPL-CCNC: 15 U/L
BILIRUB SERPL-MCNC: 0.3 MG/DL
BUN SERPL-MCNC: 12 MG/DL
CALCIUM SERPL-MCNC: 9.5 MG/DL
CHLORIDE SERPL-SCNC: 106 MMOL/L
CHOLEST SERPL-MCNC: 190 MG/DL
CO2 SERPL-SCNC: 26 MMOL/L
CREAT SERPL-MCNC: 0.82 MG/DL
EGFR: 86 ML/MIN/1.73M2
ESTIMATED AVERAGE GLUCOSE: 117 MG/DL
GLUCOSE SERPL-MCNC: 99 MG/DL
HBA1C MFR BLD HPLC: 5.7 %
HDLC SERPL-MCNC: 66 MG/DL
LDLC SERPL CALC-MCNC: 113 MG/DL
NONHDLC SERPL-MCNC: 124 MG/DL
POTASSIUM SERPL-SCNC: 4.1 MMOL/L
PROT SERPL-MCNC: 6.5 G/DL
SODIUM SERPL-SCNC: 141 MMOL/L
TRIGL SERPL-MCNC: 60 MG/DL

## 2023-08-21 PROCEDURE — 77067 SCR MAMMO BI INCL CAD: CPT | Mod: 26

## 2023-08-21 PROCEDURE — 76641 ULTRASOUND BREAST COMPLETE: CPT | Mod: 26,50

## 2023-08-21 PROCEDURE — 77063 BREAST TOMOSYNTHESIS BI: CPT | Mod: 26

## 2023-08-21 PROCEDURE — 77063 BREAST TOMOSYNTHESIS BI: CPT

## 2023-08-21 PROCEDURE — 77067 SCR MAMMO BI INCL CAD: CPT

## 2023-08-21 PROCEDURE — G0463: CPT

## 2023-08-21 PROCEDURE — 99396 PREV VISIT EST AGE 40-64: CPT

## 2023-08-21 PROCEDURE — 76641 ULTRASOUND BREAST COMPLETE: CPT

## 2023-08-21 RX ORDER — FLUOXETINE HYDROCHLORIDE 10 MG/1
10 TABLET ORAL DAILY
Qty: 30 | Refills: 2 | Status: DISCONTINUED | COMMUNITY
Start: 2019-11-08 | End: 2023-08-21

## 2023-08-21 NOTE — HEALTH RISK ASSESSMENT
[Excellent] : ~his/her~  mood as  excellent [Yes] : Yes [3 or 4 (1 pt)] : 3 or 4  (1 point) [Monthly or less (1 pt)] : Monthly or less (1 point) [Never (0 pts)] : Never (0 points) [No] : In the past 12 months have you used drugs other than those required for medical reasons? No [No falls in past year] : Patient reported no falls in the past year [PHQ-2 Negative - No further assessment needed] : PHQ-2 Negative - No further assessment needed [0] : 2) Feeling down, depressed, or hopeless: Not at all (0) [None] : None [With Family] : lives with family [Employed] : employed [] :  [Fully functional (bathing, dressing, toileting, transferring, walking, feeding)] : Fully functional (bathing, dressing, toileting, transferring, walking, feeding) [Fully functional (using the telephone, shopping, preparing meals, housekeeping, doing laundry, using] : Fully functional and needs no help or supervision to perform IADLs (using the telephone, shopping, preparing meals, housekeeping, doing laundry, using transportation, managing medications and managing finances) [Reports normal functional visual acuity (ie: able to read med bottle)] : Reports normal functional visual acuity [Smoke Detector] : smoke detector [Seat Belt] :  uses seat belt [Never] : Never [Audit-CScore] : 2 [[id number]] : 0 [Change in mental status noted] : No change in mental status noted [Language] : denies difficulty with language [Behavior] : denies difficulty with behavior [Learning/Retaining New Information] : denies difficulty learning/retaining new information [Handling Complex Tasks] : denies difficulty handling complex tasks [Reasoning] : denies difficulty with reasoning [Spatial Ability and Orientation] : denies difficulty with spatial ability and orientation [Reports changes in hearing] : Reports no changes in hearing [Reports changes in vision] : Reports no changes in vision [Reports changes in dental health] : Reports no changes in dental health

## 2023-08-23 LAB
M TB IFN-G BLD-IMP: NEGATIVE
QUANTIFERON TB PLUS MITOGEN MINUS NIL: 3.31 IU/ML
QUANTIFERON TB PLUS NIL: 0.02 IU/ML
QUANTIFERON TB PLUS TB1 MINUS NIL: -0.01 IU/ML
QUANTIFERON TB PLUS TB2 MINUS NIL: 0 IU/ML

## 2023-08-25 DIAGNOSIS — Z00.00 ENCOUNTER FOR GENERAL ADULT MEDICAL EXAMINATION WITHOUT ABNORMAL FINDINGS: ICD-10-CM

## 2024-08-21 NOTE — HISTORY OF PRESENT ILLNESS
[FreeTextEntry1] : Ms. ACOSTA is here for an annual physical examination and assessment. [de-identified] : Ms. ALAN ACOSTA is a 53 year old Other race  female  with history of  arthralgia of multiple sites, childhood asthma, menstrual migraine headache, Rt foot plantar fasciitis presented today for comprehensive evaluation. Last seen by Dr. Mares 8/5/23.

## 2024-08-21 NOTE — HEALTH RISK ASSESSMENT
[Excellent] : ~his/her~  mood as  excellent [Yes] : Yes [Monthly or less (1 pt)] : Monthly or less (1 point) [3 or 4 (1 pt)] : 3 or 4  (1 point) [Never (0 pts)] : Never (0 points) [No] : In the past 12 months have you used drugs other than those required for medical reasons? No [No falls in past year] : Patient reported no falls in the past year [0] : 2) Feeling down, depressed, or hopeless: Not at all (0) [PHQ-2 Negative - No further assessment needed] : PHQ-2 Negative - No further assessment needed [Audit-CScore] : 2 [[id number]] : 0 [Never] : Never [Change in mental status noted] : No change in mental status noted [Language] : denies difficulty with language [Behavior] : denies difficulty with behavior [Learning/Retaining New Information] : denies difficulty learning/retaining new information [Handling Complex Tasks] : denies difficulty handling complex tasks [Reasoning] : denies difficulty with reasoning [Spatial Ability and Orientation] : denies difficulty with spatial ability and orientation [None] : None [With Family] : lives with family [Employed] : employed [] :  [Fully functional (bathing, dressing, toileting, transferring, walking, feeding)] : Fully functional (bathing, dressing, toileting, transferring, walking, feeding) [Fully functional (using the telephone, shopping, preparing meals, housekeeping, doing laundry, using] : Fully functional and needs no help or supervision to perform IADLs (using the telephone, shopping, preparing meals, housekeeping, doing laundry, using transportation, managing medications and managing finances) [Reports changes in hearing] : Reports no changes in hearing [Reports changes in vision] : Reports no changes in vision [Reports normal functional visual acuity (ie: able to read med bottle)] : Reports normal functional visual acuity [Reports changes in dental health] : Reports no changes in dental health [Smoke Detector] : smoke detector [Seat Belt] :  uses seat belt

## 2024-08-22 ENCOUNTER — APPOINTMENT (OUTPATIENT)
Dept: INTERNAL MEDICINE | Facility: CLINIC | Age: 54
End: 2024-08-22

## 2024-08-22 ENCOUNTER — NON-APPOINTMENT (OUTPATIENT)
Age: 54
End: 2024-08-22

## 2024-08-22 VITALS
DIASTOLIC BLOOD PRESSURE: 78 MMHG | HEIGHT: 65 IN | BODY MASS INDEX: 26.99 KG/M2 | SYSTOLIC BLOOD PRESSURE: 110 MMHG | HEART RATE: 83 BPM | WEIGHT: 162 LBS | OXYGEN SATURATION: 97 %

## 2024-08-22 DIAGNOSIS — J45.909 UNSPECIFIED ASTHMA, UNCOMPLICATED: ICD-10-CM

## 2024-08-22 DIAGNOSIS — Z23 ENCOUNTER FOR IMMUNIZATION: ICD-10-CM

## 2024-08-22 DIAGNOSIS — E55.9 VITAMIN D DEFICIENCY, UNSPECIFIED: ICD-10-CM

## 2024-08-22 DIAGNOSIS — R00.2 PALPITATIONS: ICD-10-CM

## 2024-08-22 DIAGNOSIS — G47.33 OBSTRUCTIVE SLEEP APNEA (ADULT) (PEDIATRIC): ICD-10-CM

## 2024-08-22 DIAGNOSIS — J30.2 OTHER SEASONAL ALLERGIC RHINITIS: ICD-10-CM

## 2024-08-22 PROCEDURE — 93010 ELECTROCARDIOGRAM REPORT: CPT

## 2024-08-22 PROCEDURE — 99396 PREV VISIT EST AGE 40-64: CPT | Mod: 25

## 2024-08-22 PROCEDURE — 99213 OFFICE O/P EST LOW 20 MIN: CPT | Mod: 25

## 2024-08-24 ENCOUNTER — TRANSCRIPTION ENCOUNTER (OUTPATIENT)
Age: 54
End: 2024-08-24

## 2024-08-24 LAB
25(OH)D3 SERPL-MCNC: 27.2 NG/ML
ALBUMIN SERPL ELPH-MCNC: 4.8 G/DL
ALP BLD-CCNC: 100 U/L
ALT SERPL-CCNC: 16 U/L
ANION GAP SERPL CALC-SCNC: 10 MMOL/L
APPEARANCE: CLEAR
AST SERPL-CCNC: 19 U/L
BACTERIA: NEGATIVE /HPF
BASOPHILS # BLD AUTO: 0.05 K/UL
BASOPHILS NFR BLD AUTO: 0.8 %
BILIRUB SERPL-MCNC: 0.5 MG/DL
BILIRUBIN URINE: NEGATIVE
BLOOD URINE: NEGATIVE
BUN SERPL-MCNC: 13 MG/DL
CALCIUM SERPL-MCNC: 9.7 MG/DL
CAST: 1 /LPF
CHLORIDE SERPL-SCNC: 104 MMOL/L
CHOLEST SERPL-MCNC: 235 MG/DL
CO2 SERPL-SCNC: 25 MMOL/L
COLOR: YELLOW
CREAT SERPL-MCNC: 0.81 MG/DL
EGFR: 87 ML/MIN/1.73M2
EOSINOPHIL # BLD AUTO: 0.22 K/UL
EOSINOPHIL NFR BLD AUTO: 3.4 %
EPITHELIAL CELLS: 1 /HPF
ESTIMATED AVERAGE GLUCOSE: 117 MG/DL
GLUCOSE QUALITATIVE U: NEGATIVE MG/DL
GLUCOSE SERPL-MCNC: 100 MG/DL
HBA1C MFR BLD HPLC: 5.7 %
HBV SURFACE AB SERPL IA-ACNC: 7.6 MIU/ML
HBV SURFACE AG SER QL: NONREACTIVE
HCT VFR BLD CALC: 45 %
HDLC SERPL-MCNC: 70 MG/DL
HGB BLD-MCNC: 14.2 G/DL
IMM GRANULOCYTES NFR BLD AUTO: 0.2 %
KETONES URINE: NEGATIVE MG/DL
LDLC SERPL CALC-MCNC: 149 MG/DL
LEUKOCYTE ESTERASE URINE: ABNORMAL
LYMPHOCYTES # BLD AUTO: 1.76 K/UL
LYMPHOCYTES NFR BLD AUTO: 27.2 %
MAN DIFF?: NORMAL
MCHC RBC-ENTMCNC: 28.2 PG
MCHC RBC-ENTMCNC: 31.6 GM/DL
MCV RBC AUTO: 89.3 FL
MEV IGG FLD QL IA: >300 AU/ML
MEV IGG+IGM SER-IMP: POSITIVE
MICROSCOPIC-UA: NORMAL
MONOCYTES # BLD AUTO: 0.25 K/UL
MONOCYTES NFR BLD AUTO: 3.9 %
MUV AB SER-ACNC: POSITIVE
MUV IGG SER QL IA: 16.8 AU/ML
NEUTROPHILS # BLD AUTO: 4.17 K/UL
NEUTROPHILS NFR BLD AUTO: 64.5 %
NITRITE URINE: NEGATIVE
NONHDLC SERPL-MCNC: 164 MG/DL
PH URINE: 6.5
PLATELET # BLD AUTO: 260 K/UL
POTASSIUM SERPL-SCNC: 3.9 MMOL/L
PROT SERPL-MCNC: 7.6 G/DL
PROTEIN URINE: NEGATIVE MG/DL
RBC # BLD: 5.04 M/UL
RBC # FLD: 13.2 %
RED BLOOD CELLS URINE: 1 /HPF
REVIEW: NORMAL
RUBV IGG FLD-ACNC: 28.6 INDEX
RUBV IGG SER-IMP: POSITIVE
SODIUM SERPL-SCNC: 140 MMOL/L
SPECIFIC GRAVITY URINE: 1.02
T4 FREE SERPL-MCNC: 1.2 NG/DL
TRIGL SERPL-MCNC: 91 MG/DL
TSH SERPL-ACNC: 1.75 UIU/ML
UROBILINOGEN URINE: 0.2 MG/DL
VZV AB TITR SER: POSITIVE
VZV IGG SER IF-ACNC: 433.8 INDEX
WBC # FLD AUTO: 6.46 K/UL
WHITE BLOOD CELLS URINE: 1 /HPF

## 2024-08-26 LAB
M TB IFN-G BLD-IMP: NEGATIVE
QUANTIFERON TB PLUS MITOGEN MINUS NIL: >10 IU/ML
QUANTIFERON TB PLUS NIL: 0.02 IU/ML
QUANTIFERON TB PLUS TB1 MINUS NIL: 0 IU/ML
QUANTIFERON TB PLUS TB2 MINUS NIL: 0 IU/ML

## 2024-10-05 ENCOUNTER — APPOINTMENT (OUTPATIENT)
Dept: ULTRASOUND IMAGING | Facility: CLINIC | Age: 54
End: 2024-10-05
Payer: COMMERCIAL

## 2024-10-05 ENCOUNTER — OUTPATIENT (OUTPATIENT)
Dept: OUTPATIENT SERVICES | Facility: HOSPITAL | Age: 54
LOS: 1 days | End: 2024-10-05
Payer: COMMERCIAL

## 2024-10-05 ENCOUNTER — APPOINTMENT (OUTPATIENT)
Dept: MAMMOGRAPHY | Facility: CLINIC | Age: 54
End: 2024-10-05
Payer: COMMERCIAL

## 2024-10-05 DIAGNOSIS — Z12.31 ENCOUNTER FOR SCREENING MAMMOGRAM FOR MALIGNANT NEOPLASM OF BREAST: ICD-10-CM

## 2024-10-05 DIAGNOSIS — Z00.8 ENCOUNTER FOR OTHER GENERAL EXAMINATION: ICD-10-CM

## 2024-10-05 DIAGNOSIS — Z98.890 OTHER SPECIFIED POSTPROCEDURAL STATES: Chronic | ICD-10-CM

## 2024-10-05 PROCEDURE — 77067 SCR MAMMO BI INCL CAD: CPT

## 2024-10-05 PROCEDURE — 77063 BREAST TOMOSYNTHESIS BI: CPT

## 2024-10-05 PROCEDURE — 77063 BREAST TOMOSYNTHESIS BI: CPT | Mod: 26

## 2024-10-05 PROCEDURE — 76641 ULTRASOUND BREAST COMPLETE: CPT

## 2024-10-05 PROCEDURE — 77067 SCR MAMMO BI INCL CAD: CPT | Mod: 26

## 2024-10-05 PROCEDURE — 76641 ULTRASOUND BREAST COMPLETE: CPT | Mod: 26,50

## 2024-12-04 ENCOUNTER — APPOINTMENT (OUTPATIENT)
Dept: CARDIOLOGY | Facility: CLINIC | Age: 54
End: 2024-12-04

## 2025-03-11 ENCOUNTER — NON-APPOINTMENT (OUTPATIENT)
Age: 55
End: 2025-03-11

## 2025-04-02 PROBLEM — F43.20 GRIEF REACTION: Status: RESOLVED | Noted: 2023-07-17 | Resolved: 2023-08-21

## 2025-08-25 ENCOUNTER — APPOINTMENT (OUTPATIENT)
Dept: INTERNAL MEDICINE | Facility: CLINIC | Age: 55
End: 2025-08-25
Payer: COMMERCIAL

## 2025-08-25 DIAGNOSIS — J45.909 UNSPECIFIED ASTHMA, UNCOMPLICATED: ICD-10-CM

## 2025-08-25 DIAGNOSIS — Z00.00 ENCOUNTER FOR GENERAL ADULT MEDICAL EXAMINATION W/OUT ABNORMAL FINDINGS: ICD-10-CM

## 2025-08-25 DIAGNOSIS — Z23 ENCOUNTER FOR IMMUNIZATION: ICD-10-CM

## 2025-08-25 DIAGNOSIS — R00.2 PALPITATIONS: ICD-10-CM

## 2025-08-25 DIAGNOSIS — E55.9 VITAMIN D DEFICIENCY, UNSPECIFIED: ICD-10-CM

## 2025-08-25 PROCEDURE — 99396 PREV VISIT EST AGE 40-64: CPT | Mod: 25

## 2025-08-26 ENCOUNTER — TRANSCRIPTION ENCOUNTER (OUTPATIENT)
Age: 55
End: 2025-08-26

## 2025-08-26 ENCOUNTER — APPOINTMENT (OUTPATIENT)
Dept: INTERNAL MEDICINE | Facility: CLINIC | Age: 55
End: 2025-08-26

## 2025-08-26 LAB
25(OH)D3 SERPL-MCNC: 31.8 NG/ML
ALBUMIN SERPL ELPH-MCNC: 4.4 G/DL
ALP BLD-CCNC: 85 U/L
ALT SERPL-CCNC: 14 U/L
ANION GAP SERPL CALC-SCNC: 15 MMOL/L
AST SERPL-CCNC: 14 U/L
BILIRUB SERPL-MCNC: 0.2 MG/DL
BUN SERPL-MCNC: 15 MG/DL
CALCIUM SERPL-MCNC: 9.8 MG/DL
CHLORIDE SERPL-SCNC: 105 MMOL/L
CHOLEST SERPL-MCNC: 200 MG/DL
CO2 SERPL-SCNC: 22 MMOL/L
CREAT SERPL-MCNC: 0.74 MG/DL
EGFRCR SERPLBLD CKD-EPI 2021: 96 ML/MIN/1.73M2
ESTIMATED AVERAGE GLUCOSE: 111 MG/DL
GLUCOSE SERPL-MCNC: 88 MG/DL
HBA1C MFR BLD HPLC: 5.5 %
HBV SURFACE AB SER QL: NONREACTIVE
HCT VFR BLD CALC: 41.6 %
HDLC SERPL-MCNC: 69 MG/DL
HGB BLD-MCNC: 13.4 G/DL
LDLC SERPL-MCNC: 115 MG/DL
MAGNESIUM SERPL-MCNC: 2.2 MG/DL
MCHC RBC-ENTMCNC: 29.1 PG
MCHC RBC-ENTMCNC: 32.2 G/DL
MCV RBC AUTO: 90.2 FL
NONHDLC SERPL-MCNC: 132 MG/DL
PLATELET # BLD AUTO: 236 K/UL
POTASSIUM SERPL-SCNC: 4.5 MMOL/L
PROT SERPL-MCNC: 6.8 G/DL
RBC # BLD: 4.61 M/UL
RBC # FLD: 13.5 %
SODIUM SERPL-SCNC: 142 MMOL/L
TRIGL SERPL-MCNC: 94 MG/DL
TSH SERPL-ACNC: 1.55 UIU/ML
WBC # FLD AUTO: 5.32 K/UL

## 2025-08-28 LAB
M TB IFN-G BLD-IMP: NEGATIVE
QUANTIFERON TB PLUS MITOGEN MINUS NIL: 6.95 IU/ML
QUANTIFERON TB PLUS NIL: 0.02 IU/ML
QUANTIFERON TB PLUS TB1 MINUS NIL: 0 IU/ML
QUANTIFERON TB PLUS TB2 MINUS NIL: 0 IU/ML